# Patient Record
Sex: FEMALE | URBAN - NONMETROPOLITAN AREA
[De-identification: names, ages, dates, MRNs, and addresses within clinical notes are randomized per-mention and may not be internally consistent; named-entity substitution may affect disease eponyms.]

---

## 2024-01-23 ENCOUNTER — TELEPHONE (OUTPATIENT)
Dept: FAMILY MEDICINE CLINIC | Facility: CLINIC | Age: 64
End: 2024-01-23

## 2024-01-23 NOTE — TELEPHONE ENCOUNTER
Caller: Esme Armenta    Relationship to patient: Self    Best call back number: [phone]     Chief complaint: INITIAL ADHD EVALUATION     Type of visit: NEW PATIENT    Requested date: NONE SPECIFIED     Additional notes:    HUB UNABLE TO WARM TRANSFER.